# Patient Record
(demographics unavailable — no encounter records)

---

## 2025-05-20 NOTE — HISTORY OF PRESENT ILLNESS
[de-identified] : Ms. FAUSTO HEIN is a 31-year-old woman, referred by Dr. Kee Sandoval for consultation regarding B/L nipple discharge, here for an initial visit. Reports B/L yellow nipple discharge, denies any palpable masses. She was breast feeding until December, 2024. There is no spontaneous drainage but she can express milky/yellow drainage from both nipples . No blood.  B/L sono 8/2023 ([facility]) - BIRADS 1  B/L sono 4/2025 ([facility]) - BI-RADS 2  *clinical f/u recommended for c/o nipple discharge, if there is persistent c/f suspicious clinical findings diagnostic mammo should be performed

## 2025-05-20 NOTE — PHYSICAL EXAM
[Normal] : supple, no neck mass and thyroid not enlarged [Normal Neck Lymph Nodes] : normal neck lymph nodes  [Normal Supraclavicular Lymph Nodes] : normal supraclavicular lymph nodes [Normal Groin Lymph Nodes] : normal groin lymph nodes [Normal Axillary Lymph Nodes] : normal axillary lymph nodes [Normal] : oriented to person, place and time, with appropriate affect [de-identified] : Mild fibrocystic changes but no masses. i was able to express milky drainage from both nipples.

## 2025-05-20 NOTE — ASSESSMENT
[FreeTextEntry1] : IMP: 32yo F w/ B/L yellow nipple discharge & negative imaging 5 months since stopping breast feeding   PLAN: no treattment at this point Baseline mammogram at 35

## 2025-05-20 NOTE — CONSULT LETTER
[Dear  ___] : Dear  [unfilled], [Consult Letter:] : I had the pleasure of evaluating your patient, [unfilled]. [Please see my note below.] : Please see my note below. [Consult Closing:] : Thank you very much for allowing me to participate in the care of this patient.  If you have any questions, please do not hesitate to contact me. [Sincerely,] : Sincerely, [FreeTextEntry2] : Kee Sandoval MD [FreeTextEntry1] : She just has some residual milk from breast feeding. [FreeTextEntry3] : Ricardo Day MD FACS Chief of Surgical Oncology

## 2025-05-20 NOTE — HISTORY OF PRESENT ILLNESS
[de-identified] : Ms. FAUSTO HEIN is a 31-year-old woman, referred by Dr. Kee Sandoval for consultation regarding B/L nipple discharge, here for an initial visit. Reports B/L yellow nipple discharge, denies any palpable masses. She was breast feeding until December, 2024. There is no spontaneous drainage but she can express milky/yellow drainage from both nipples . No blood.  B/L sono 8/2023 ([facility]) - BIRADS 1  B/L sono 4/2025 ([facility]) - BI-RADS 2  *clinical f/u recommended for c/o nipple discharge, if there is persistent c/f suspicious clinical findings diagnostic mammo should be performed

## 2025-05-20 NOTE — PHYSICAL EXAM
[Normal] : supple, no neck mass and thyroid not enlarged [Normal Neck Lymph Nodes] : normal neck lymph nodes  [Normal Supraclavicular Lymph Nodes] : normal supraclavicular lymph nodes [Normal Groin Lymph Nodes] : normal groin lymph nodes [Normal Axillary Lymph Nodes] : normal axillary lymph nodes [Normal] : oriented to person, place and time, with appropriate affect [de-identified] : Mild fibrocystic changes but no masses. i was able to express milky drainage from both nipples.